# Patient Record
Sex: FEMALE | Race: BLACK OR AFRICAN AMERICAN
[De-identification: names, ages, dates, MRNs, and addresses within clinical notes are randomized per-mention and may not be internally consistent; named-entity substitution may affect disease eponyms.]

---

## 2021-06-11 ENCOUNTER — HOSPITAL ENCOUNTER (EMERGENCY)
Dept: HOSPITAL 63 - ER | Age: 26
Discharge: HOME | End: 2021-06-11
Payer: COMMERCIAL

## 2021-06-11 VITALS — WEIGHT: 257.5 LBS | HEIGHT: 66 IN | BODY MASS INDEX: 41.38 KG/M2

## 2021-06-11 VITALS — SYSTOLIC BLOOD PRESSURE: 99 MMHG | DIASTOLIC BLOOD PRESSURE: 47 MMHG

## 2021-06-11 DIAGNOSIS — R10.13: Primary | ICD-10-CM

## 2021-06-11 DIAGNOSIS — F12.10: ICD-10-CM

## 2021-06-11 DIAGNOSIS — F17.210: ICD-10-CM

## 2021-06-11 DIAGNOSIS — I10: ICD-10-CM

## 2021-06-11 LAB
% ATYL: 6 % (ref 0–0)
% LYMPHS: 21 % (ref 24–48)
% MONOS: 3 % (ref 0–10)
% SEGS: 69 % (ref 35–66)
ALBUMIN SERPL-MCNC: 3.8 G/DL (ref 3.4–5)
ALBUMIN/GLOB SERPL: 0.8 {RATIO} (ref 1–1.7)
ALP SERPL-CCNC: 75 U/L (ref 46–116)
ALT SERPL-CCNC: 43 U/L (ref 14–59)
AMPHETAMINE/METHAMPHETAMINE: (no result)
ANION GAP SERPL CALC-SCNC: 15 MMOL/L (ref 6–14)
APTT PPP: YELLOW S
AST SERPL-CCNC: 28 U/L (ref 15–37)
BACTERIA #/AREA URNS HPF: (no result) /HPF
BARBITURATES UR-MCNC: (no result) UG/ML
BASOPHILS # BLD AUTO: 0.1 X10^3/UL (ref 0–0.2)
BASOPHILS NFR BLD: 1 % (ref 0–3)
BENZODIAZ UR-MCNC: (no result) UG/L
BILIRUB SERPL-MCNC: 0.4 MG/DL (ref 0.2–1)
BILIRUB UR QL STRIP: (no result)
BUN/CREAT SERPL: 13 (ref 6–20)
CA-I SERPL ISE-MCNC: 10 MG/DL (ref 7–20)
CALCIUM SERPL-MCNC: 9.4 MG/DL (ref 8.5–10.1)
CANNABINOIDS UR-MCNC: (no result) UG/L
CHLORIDE SERPL-SCNC: 104 MMOL/L (ref 98–107)
CO2 SERPL-SCNC: 22 MMOL/L (ref 21–32)
COCAINE UR-MCNC: (no result) NG/ML
CREAT SERPL-MCNC: 0.8 MG/DL (ref 0.6–1)
EOSINOPHIL NFR BLD AUTO: 1 % (ref 0–5)
EOSINOPHIL NFR BLD: 0.2 X10^3/UL (ref 0–0.7)
EOSINOPHIL NFR BLD: 1 % (ref 0–3)
ERYTHROCYTE [DISTWIDTH] IN BLOOD BY AUTOMATED COUNT: 14.9 % (ref 11.5–14.5)
FIBRINOGEN PPP-MCNC: (no result) MG/DL
GFR SERPLBLD BASED ON 1.73 SQ M-ARVRAT: 105.8 ML/MIN
GLOBULIN SER-MCNC: 4.5 G/DL (ref 2.2–3.8)
GLUCOSE SERPL-MCNC: 128 MG/DL (ref 70–99)
GLUCOSE UR STRIP-MCNC: (no result) MG/DL
HCT VFR BLD CALC: 46.3 % (ref 36–47)
HGB BLD-MCNC: 14.9 G/DL (ref 12–15.5)
LIPASE: 59 U/L (ref 73–393)
LYMPHOCYTES # BLD: 3.9 X10^3/UL (ref 1–4.8)
LYMPHOCYTES NFR BLD AUTO: 23 % (ref 24–48)
MCH RBC QN AUTO: 27 PG (ref 25–35)
MCHC RBC AUTO-ENTMCNC: 32 G/DL (ref 31–37)
MCV RBC AUTO: 83 FL (ref 79–100)
METHADONE SERPL-MCNC: (no result) NG/ML
MONO #: 0.7 X10^3/UL (ref 0–1.1)
MONOCYTES NFR BLD: 4 % (ref 0–9)
NEUT #: 12.3 X10^3UL (ref 1.8–7.7)
NEUTROPHILS NFR BLD AUTO: 71 % (ref 31–73)
NITRITE UR QL STRIP: (no result)
OPIATES UR-MCNC: (no result) NG/ML
PCP SERPL-MCNC: (no result) MG/DL
PLATELET # BLD AUTO: 297 X10^3/UL (ref 140–400)
PLATELET # BLD EST: ADEQUATE 10*3/UL
POTASSIUM SERPL-SCNC: 3.5 MMOL/L (ref 3.5–5.1)
PROT SERPL-MCNC: 8.3 G/DL (ref 6.4–8.2)
RBC # BLD AUTO: 5.6 X10^6/UL (ref 3.5–5.4)
RBC #/AREA URNS HPF: (no result) /HPF (ref 0–2)
SODIUM SERPL-SCNC: 141 MMOL/L (ref 136–145)
SP GR UR STRIP: 1.01
SQUAMOUS #/AREA URNS LPF: (no result) /LPF
U PREG PATIENT: NEGATIVE
UROBILINOGEN UR-MCNC: 0.2 MG/DL
WBC # BLD AUTO: 17.3 X10^3/UL (ref 4–11)
WBC #/AREA URNS HPF: (no result) /HPF (ref 0–4)

## 2021-06-11 PROCEDURE — 81025 URINE PREGNANCY TEST: CPT

## 2021-06-11 PROCEDURE — 99285 EMERGENCY DEPT VISIT HI MDM: CPT

## 2021-06-11 PROCEDURE — 96375 TX/PRO/DX INJ NEW DRUG ADDON: CPT

## 2021-06-11 PROCEDURE — 85025 COMPLETE CBC W/AUTO DIFF WBC: CPT

## 2021-06-11 PROCEDURE — 80053 COMPREHEN METABOLIC PANEL: CPT

## 2021-06-11 PROCEDURE — 85007 BL SMEAR W/DIFF WBC COUNT: CPT

## 2021-06-11 PROCEDURE — 83690 ASSAY OF LIPASE: CPT

## 2021-06-11 PROCEDURE — 87086 URINE CULTURE/COLONY COUNT: CPT

## 2021-06-11 PROCEDURE — 96374 THER/PROPH/DIAG INJ IV PUSH: CPT

## 2021-06-11 PROCEDURE — 81001 URINALYSIS AUTO W/SCOPE: CPT

## 2021-06-11 PROCEDURE — 80307 DRUG TEST PRSMV CHEM ANLYZR: CPT

## 2021-06-11 PROCEDURE — 36415 COLL VENOUS BLD VENIPUNCTURE: CPT

## 2021-06-11 PROCEDURE — 87077 CULTURE AEROBIC IDENTIFY: CPT

## 2021-06-11 PROCEDURE — 74177 CT ABD & PELVIS W/CONTRAST: CPT

## 2021-06-11 PROCEDURE — 96361 HYDRATE IV INFUSION ADD-ON: CPT

## 2021-06-11 PROCEDURE — 93005 ELECTROCARDIOGRAM TRACING: CPT

## 2021-06-11 NOTE — RAD
INDICATION: Reason: etoh abuse, epigastric pain / Spl. Instructions:  / History: .  



COMPARISON: None.



TECHNIQUE:



Axial CT images obtained through the abdomen and pelvis with contrast.



One or more of the following individualized dose reduction techniques were utilized for this examinat
ion:  1. Automated exposure control;  2. Adjustment of the mA and/or kV according to patient size;  3
. Use of iterative reconstruction technique.



FINDINGS:



Abdominal aorta is not aneurysmal.

No intrahepatic bile duct dilation. Liver is prominent in size. Gallbladder is distended at time of e
xam.

No peripancreatic fluid collection.

Spleen is unremarkable.

No hydronephrosis.

Urinary bladder is partially distended.

Fat-containing umbilical hernia.

Uterus is visualized.

No periappendiceal inflammatory changes.

No dilated loops of bowel to suggest obstruction.

Degenerative changes of the spine. This includes at L4-5 where there is a disc protrusion associated 
central canal stenosis.



IMPRESSION:



*  No evidence of bowel obstruction or appendicitis.



*  No hydronephrosis.



Electronically signed by: Carlitos Kaur MD (6/11/2021 10:20 AM) [id number]

## 2021-06-11 NOTE — PHYS DOC
Past History


Past Medical History:  Alcoholism, Anxiety, Depression, Hypertension


Past Surgical History:  No Surgical History


Alcohol Use:  Heavy


Additional Alcohol Information:  


drinks one bottle palmason daily





Adult General


Chief Complaint


Chief Complaint:  ABDOMINAL PAIN





HPI


HPI





Patient is a 25-year-old presenting for abdominal pain.  Onset was this morning 

shortly after waking up.  No known inciting event or trauma.  Nothing known 

makes better or worse.  Patient reports mainly epigastric pain that is 

generalized in nature without radiation, states it is gnawing and making her 

nauseous.  She has suffered several episodes of nonbloody nonbilious emesis.  

Admits history of alcoholism, drinks at least a pint of hard liquor daily with 

last drink yesterday evening.  Also reports she smokes marijuana, denies any 

change in type/strain or increased frequency.  Has history of abdominal pain 

like this in the past but is never been diagnosed with pancreatitis or been ho

spitalized.  No fever, hemoptysis, history of abdominal surgeries





Review of Systems


Review of Systems


Fourteen body systems of review of systems have been reviewed. See HPI for 

pertinent positives and negative responses, other wise all other systems are 

negative, non-pertinent or non-contributory





Allergies


Allergies





Allergies








Coded Allergies Type Severity Reaction Last Updated Verified


 


  No Known Drug Allergies    6/11/21 No











Physical Exam


Physical Exam


Constitutional: Well developed, well nourished, diaphoretic and appears in acute

pain


HENT: Normocephalic, atraumatic, diaphoretic, bilateral external ears normal, 

oropharynx moist, no oral exudates, nose normal. 


Eyes: PERRLA, EOMI, conjunctiva normal, no discharge.  


Neck: Normal range of motion, no tenderness, supple, no stridor.  


Cardiovascular: Heart rate regular, sinus rhythm, no murmurs rubs or gallops


Lungs & Thorax:  Bilateral breath sounds clear to auscultation 


Abdomen: Bowel sounds normal, soft, epigastric tenderness, no masses, no 

pulsatile masses.  Nonsurgical abdomen, no peritoneal signs


Skin: Warm, dry, no erythema, no rash.  


Back: No tenderness, no CVA tenderness.  


Extremities: No tenderness, no cyanosis, no clubbing, ROM intact, no edema.  


Neurologic: Alert and oriented X 3, grossly normal motor & sensory function, no 

focal deficits noted. 


Psychologic: Anxious affect, judgement normal, mood normal.





Current Patient Data


Vital Signs





                                   Vital Signs








  Date Time  Temp Pulse Resp B/P (MAP) Pulse Ox O2 Delivery O2 Flow Rate FiO2


 


6/11/21 09:10 97.3 56 24 148/94 (112) 100 Room Air  








Lab Results





Laboratory Tests








Test


 6/11/21


09:55


 


White Blood Count 17.3 x10^3/uL 


 


Red Blood Count 5.60 x10^6/uL 


 


Hemoglobin 14.9 g/dL 


 


Hematocrit 46.3 % 


 


Mean Corpuscular Volume 83 fL 


 


Mean Corpuscular Hemoglobin 27 pg 


 


Mean Corpuscular Hemoglobin


Concent 32 g/dL 





 


Red Cell Distribution Width 14.9 % 


 


Platelet Count 297 x10^3/uL 


 


Neutrophils (%) (Auto) 71 % 


 


Lymphocytes (%) (Auto) 23 % 


 


Monocytes (%) (Auto) 4 % 


 


Eosinophils (%) (Auto) 1 % 


 


Basophils (%) (Auto) 1 % 


 


Neutrophils # (Auto) 12.3 x10^3uL 


 


Lymphocytes # (Auto) 3.9 x10^3/uL 


 


Monocytes # (Auto) 0.7 x10^3/uL 


 


Eosinophils # (Auto) 0.2 x10^3/uL 


 


Basophils # (Auto) 0.1 x10^3/uL 


 


Platelet Estimate Pending 


 


Sodium Level 141 mmol/L 


 


Potassium Level 3.5 mmol/L 


 


Chloride Level 104 mmol/L 


 


Carbon Dioxide Level 22 mmol/L 


 


Anion Gap 15 


 


Blood Urea Nitrogen 10 mg/dL 


 


Creatinine 0.8 mg/dL 


 


Estimated GFR


(Cockcroft-Gault) 105.8 





 


BUN/Creatinine Ratio 13 


 


Glucose Level 128 mg/dL 


 


Calcium Level 9.4 mg/dL 


 


Total Bilirubin 0.4 mg/dL 


 


Aspartate Amino Transf


(AST/SGOT) 28 U/L 





 


Alanine Aminotransferase


(ALT/SGPT) 43 U/L 





 


Alkaline Phosphatase 75 U/L 


 


Total Protein 8.3 g/dL 


 


Albumin 3.8 g/dL 


 


Albumin/Globulin Ratio 0.8 


 


Lipase 59 U/L 








Current Medications








 Medications


  (Trade)  Dose


 Ordered  Sig/Marisabel


 Route


 PRN Reason  Start Time


 Stop Time Status Last Admin


Dose Admin


 


 Sodium Chloride  1,000 ml @ 


 1,000 mls/hr  Q1H


 IV


   6/11/21 09:45


 6/11/21 10:44 DC 6/11/21 09:45





 


 Fentanyl Citrate


  (Fentanyl 2ml


 Vial)  75 mcg  1X  ONCE


 IVP


   6/11/21 09:45


 6/11/21 09:46 DC 6/11/21 09:50





 


 Lorazepam


  (Ativan Inj)  0.5 mg  1X  ONCE


 IVP


   6/11/21 09:45


 6/11/21 09:46 DC 6/11/21 09:47





 


 Iohexol


  (Omnipaque 300


 Mg/ml)  75 ml  1X  ONCE


 IV


   6/11/21 09:45


 6/11/21 09:46 DC 6/11/21 10:01














EKG


EKG


EKG ordered and interpreted by myself at 1026 hrs. as sinus rhythm at 52 bpm, 

unremarkable intervals, no axis deviation, no acute ischemic findings, no STEMI





Radiology/Procedures


Radiology/Procedures





INDICATION: Reason: etoh abuse, epigastric pain / Spl. Instructions:  / History:

.  





COMPARISON: None.





TECHNIQUE:





Axial CT images obtained through the abdomen and pelvis with contrast.





One or more of the following individualized dose reduction techniques were 

utilized for this examination:  1. Automated exposure control;  2. Adjustment of

the mA and/or kV according to patient size;  3. Use of iterative reconstruction 

technique.





FINDINGS:





Abdominal aorta is not aneurysmal.


No intrahepatic bile duct dilation. Liver is prominent in size. Gallbladder is d

istended at time of exam.


No peripancreatic fluid collection.


Spleen is unremarkable.


No hydronephrosis.


Urinary bladder is partially distended.


Fat-containing umbilical hernia.


Uterus is visualized.


No periappendiceal inflammatory changes.


No dilated loops of bowel to suggest obstruction.


Degenerative changes of the spine. This includes at L4-5 where there is a disc 

protrusion associated central canal stenosis.





IMPRESSION:





*  No evidence of bowel obstruction or appendicitis.





*  No hydronephrosis.





Electronically signed by: Carlitos Kaur MD (6/11/2021 10:20 AM) [id number]











Heart Score


C/O Chest Pain:  No


HEART Score for Chest Pain:  








HEART Score for Chest Pain Response (Comments) Value


 


History Slighlty/Non-Suspicious 0


 


ECG Normal 0


 


Age < 45 0


 


Risk Factors                            1 or 2 Risk Factors 1


 


Total  1








Risk Factors:


Risk Factors:  DM, Current or recent (<one month) smoker, HTN, HLP, family 

history of CAD, obesity.


Risk Scores:


Risk Factors:  DM, Current or recent (<one month) smoker, HTN, HLP, family hi

story of CAD, obesity.





Course & Med Decision Making


Course & Med Decision Making


Discussed with the patient all findings and diagnostic testing. I discussed most

likely diagnosis of abdominal pain unspecified.  Discussed pain could be due to 

alcohol dependence and sequelae from drinking more than usual from night prior, 

could be gastritis.  Also disclosed this could be cannabinoid hyperemesis 

syndrome.  Also disclosed this might be an acute presentation more concerning 

pathology.  Patient responded to ER intervention, was asymptomatic after IV 

fluids, IV pain control and low-dose benzodiazepine.  I reviewed need for 

further diagnostic work-up and discussed role of potential hospitalization; 

however, joint decision made given complete improvement in symptoms for 

discharge home with close PCP follow-up.  As such, I stressed need for close 

outpatient follow-up to review today's ER visit. Strict return precautions were 

also discussed at length with good understanding by patient. Patient voiced 

understanding and agreement with the plan. Patient knows to come back for repeat

evaluation if concerning signs or symptoms present prior to outpatient follow-

up. Hemodynamically stable, ambulatory and well-appearing at time of 

disposition.





Dragon Disclaimer


Dragon Disclaimer


This electronic medical record was generated, in whole or in part, using a voice

recognition dictation system.





Departure


Departure:


Impression:  


   Primary Impression:  


   Abdominal pain


Disposition:  01 HOME / SELF CARE / HOMELESS


Condition:  IMPROVED


Referrals:  


ALISON HEADLEY MD (PCP)


Patient Instructions:  Abdominal Pain





Additional Instructions:  


You have been evaluated in the Emergency Department today for abdominal pain. 

Your evaluation was not suggestive of any emergent condition requiring medical 

intervention at this time. However, some abdominal problems make take more time 

to appear. Therefore, it is important for you to watch for any new symptoms or 

worsening of your current condition. 


As discussed, please contact your primary care physician immediately after your 

departure to review ER visit today.  In addition, it would be beneficial to 

discuss safely cutting back and hopefully stopping your alcohol and marijuana 

use


Return to the Emergency Department if you experience worsening pain, persistent 

fevers greater than 100.4, recurrent vomiting, blood in vomit, blood in stool, 

dark tarry stool, chest pain, difficulty breathing, or any other concerning 

symptoms.











RITU STEVEN DO                 Jun 11, 2021 09:36

## 2021-06-11 NOTE — EKG
[facility] [address], [place] [address]

Test Date:    2021               Test Time:    10:17:53

Pat Name:     LISA XIONG        Department:   

Patient ID:   [id number]           Room:          

Gender:       F                        Technician:   FRANCIA

:          1995               Requested By: RITU STEVEN

Order Number: [id number]            Reading MD:     

                                 Measurements

Intervals                              Axis          

Rate:         52                       P:            57

ND:           158                      QRS:          34

QRSD:         90                       T:            31

QT:           406                                    

QTc:          383                                    

                           Interpretive Statements

SINUS RHYTHM

OTHERWISE NORMAL ECG

RI6.02

No previous ECG available for comparison

## 2022-01-13 ENCOUNTER — HOSPITAL ENCOUNTER (EMERGENCY)
Dept: HOSPITAL 63 - ER | Age: 27
LOS: 1 days | Discharge: HOME | End: 2022-01-14
Payer: COMMERCIAL

## 2022-01-13 VITALS — DIASTOLIC BLOOD PRESSURE: 92 MMHG | SYSTOLIC BLOOD PRESSURE: 140 MMHG

## 2022-01-13 VITALS — WEIGHT: 257.5 LBS | BODY MASS INDEX: 41.38 KG/M2 | HEIGHT: 66 IN

## 2022-01-13 DIAGNOSIS — F32.9: Primary | ICD-10-CM

## 2022-01-13 DIAGNOSIS — Z76.0: ICD-10-CM

## 2022-01-13 DIAGNOSIS — Z20.822: ICD-10-CM

## 2022-01-13 DIAGNOSIS — B34.9: ICD-10-CM

## 2022-01-13 DIAGNOSIS — I10: ICD-10-CM

## 2022-01-13 PROCEDURE — 87428 SARSCOV & INF VIR A&B AG IA: CPT

## 2022-01-13 PROCEDURE — 99283 EMERGENCY DEPT VISIT LOW MDM: CPT

## 2022-01-13 NOTE — PHYS DOC
Past History


Past Medical History:  Alcoholism, Anxiety, Constipation, Depression, 

Hypertension


Past Surgical History:  No Surgical History


Alcohol Use:  Heavy





General Adult


EDM:


Chief Complaint:  MEDICATION REFILL





HPI:


HPI:


".. I ve got a fever... out of my Lexapro .. the last three days..  and under 

stress...."





Patient is a 26 year old female who presents with request for med refill and a 

dose of Lexapro tonight.  Patient normally on Lexapro 20 mg every night.  

Patient has had flu vaccination as well as COVID vaccination.   Patient 

concerned because she has a fever.  Did check earlier in the week and her rapid 

COVID was negative.  No recent travel.  No specific ill contacts.  Does have a 

history of depression and anxiety..  Patient denies any current suicidal 

ideation or homicidal ideation.  Has no formed plan for suicide.  Patient does 

have a primary care physician as well as counselor who refills her medications. 

 Patient complains of generalized myalgia, arthralgia and malaise.  Pt.  

declines psychiatric assessment team evaluation at this time.  Patient is 

accompanied with her mother.





Review of Systems:


Review of Systems:


Constitutional: Complains of fever or chills 


Eyes:  Denies change in visual acuity 


HENT:  Denies nasal congestion or sore throat 


Respiratory: Complains of a nonproductive cough 


Cardiovascular:  Denies chest pain or edema 


GI:  Denies abdominal pain, nausea, vomiting, bloody stools or diarrhea 


: Denies dysuria 


Musculoskeletal: Complains of myalgia and arthralgia and malaise


Integument:  Denies rash 


Neurologic:  Denies headache, focal weakness or sensory changes 


Endocrine:  Denies polyuria or polydipsia 


Lymphatic:  Denies swollen glands 


Psychiatric:  Denies depression or anxiety





Family History:


Family History:


Noncontributory to presentation





Current Medications:


Current Meds:


See nursing for home meds





Allergies:


Allergies:





Allergies








Coded Allergies Type Severity Reaction Last Updated Verified


 


  No Known Drug Allergies    6/11/21 No











Physical Exam:


PE:





Constitutional: Well developed, well nourished, moderate acute distress, non-

toxic appearance. []


HENT: Normocephalic, atraumatic, bilateral external ears normal, oropharynx 

moist, no oral exudates, nose swollen turbinates clear rhinorrhea.  Postnasal 

drainage.


Eyes: PERRLA, EOMI, conjunctiva normal, no discharge. [] 


Neck: Normal range of motion, no tenderness, supple, no stridor. [] 


Cardiovascular:Heart rate regular rhythm, no murmur []


Lungs & Thorax:  Bilateral breath sounds equal at apex with a few scattered 

wheezes on Auscultation []


Abdomen: Bowel sounds normal, soft, no tenderness, no masses, no pulsatile 

masses.  Obese


Skin: Warm, dry, no erythema, no rash. [] 


Back: No tenderness, no CVA tenderness. [] 


Extremities: No tenderness, no cyanosis, no clubbing, ROM intact, no edema. [] 


Neurologic: Alert and oriented X 3, normal motor function, normal sensory 

function, no focal deficits noted. []


Psychologic: Affect anxious, angry,, judgement-appears to have good insight,, 

mood complaints of depression but no suicidal ideation currently.





EKG:


EKG:


[]





Radiology/Procedures:


Radiology/Procedures:


[]





Heart Score:


C/O Chest Pain:  N/A


Risk Factors:


Risk Factors:  DM, Current or recent (<one month) smoker, HTN, HLP, family 

history of CAD, obesity.


Risk Scores:


Score 0 - 3:  2.5% MACE over next 6 weeks - Discharge Home


Score 4 - 6:  20.3% MACE over next 6 weeks - Admit for Clinical Observation


Score 7 - 10:  72.7% MACE over next 6 weeks - Early Invasive Strategies





Course & Med Decision Making:


Course & Med Decision Making


Pertinent Labs and Imaging studies reviewed. (See chart for details)








Patient push clear fluids.  Patient take her Lexapro 20 mg daily.  To call her 

physician in the morning.  Currently this is not a drug we have in stock at [facility].  Patient informed of her COVID and influenza screen.  Mother still 

practices if she has COVID and self isolate.  Return if any concerns.  Push 

fluids.  Clear fluid diet.  Tylenol and ibuprofen for discomfort.  Return if any

 concerns.  Viral isolation-and must wear mask covering nose and mouth and her 

interactions with others.  Must self isolate for the next 5 days.   To retest 

after 5 days if needed for return to work.  Currently rapid influenza and COVID 

results are negative








Impression:





1.  Depression-with no current suicidal ideation or suicidal plan


2.  Viral syndrome





[]





Dragon Disclaimer:


Dragon Disclaimer:


This electronic medical record was generated, in whole or in part, using a voice

 recognition dictation system.





Departure


Departure:


Referrals:  


ALISON HEADLEY MD (PCP)





Dragon Disclaimer


This chart was dictated in whole or in part using Voice Recognition software in 

a busy, high-work load, and often noisy Emergency Department environment.  It 

may contain unintended and wholly unrecognized errors or omissions.





Dragon Disclaimer


This chart was dictated in whole or in part using Voice Recognition software in 

a busy, high-work load, and often noisy Emergency Department environment.  It 

may contain unintended and wholly unrecognized errors or omissions.











CARLITOS SALAS MD           Jan 13, 2022 23:48

## 2022-01-14 LAB
INFLUENZA A PATIENT: NEGATIVE
INFLUENZA B PATIENT: NEGATIVE

## 2022-04-22 ENCOUNTER — HOSPITAL ENCOUNTER (EMERGENCY)
Dept: HOSPITAL 63 - ER | Age: 27
LOS: 1 days | Discharge: PSYCHIATRIC OR SUBSTANCE USE FACILITY | End: 2022-04-23
Payer: COMMERCIAL

## 2022-04-22 VITALS — BODY MASS INDEX: 41.31 KG/M2 | WEIGHT: 257.06 LBS | HEIGHT: 66 IN

## 2022-04-22 VITALS — SYSTOLIC BLOOD PRESSURE: 142 MMHG | DIASTOLIC BLOOD PRESSURE: 94 MMHG

## 2022-04-22 DIAGNOSIS — Z20.822: ICD-10-CM

## 2022-04-22 DIAGNOSIS — F41.9: ICD-10-CM
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
## 2022-04-22 DIAGNOSIS — F32.9: Primary | ICD-10-CM

## 2022-04-22 DIAGNOSIS — I10: ICD-10-CM

## 2022-04-22 DIAGNOSIS — F10.20: ICD-10-CM

## 2022-04-22 DIAGNOSIS — R45.851: ICD-10-CM

## 2022-04-22 LAB
ACETAMIN: < 2 MCG/ML (ref 10–30)
ALBUMIN SERPL-MCNC: 3.5 G/DL (ref 3.4–5)
ALBUMIN/GLOB SERPL: 0.7 {RATIO} (ref 1–1.7)
ALP SERPL-CCNC: 90 U/L (ref 46–116)
ALT SERPL-CCNC: 46 U/L (ref 14–59)
AMPHETAMINE/METHAMPHETAMINE: (no result)
ANION GAP SERPL CALC-SCNC: 10 MMOL/L (ref 6–14)
APTT PPP: YELLOW S
AST SERPL-CCNC: 35 U/L (ref 15–37)
BACTERIA #/AREA URNS HPF: 0 /HPF
BARBITURATES UR-MCNC: (no result) UG/ML
BASOPHILS # BLD AUTO: 0.1 X10^3/UL (ref 0–0.2)
BASOPHILS NFR BLD: 1 % (ref 0–3)
BENZODIAZ UR-MCNC: (no result) UG/L
BILIRUB SERPL-MCNC: 0.3 MG/DL (ref 0.2–1)
BUN/CREAT SERPL: 11 (ref 6–20)
CA-I SERPL ISE-MCNC: 8 MG/DL (ref 7–20)
CALCIUM SERPL-MCNC: 9.1 MG/DL (ref 8.5–10.1)
CANNABINOIDS UR-MCNC: (no result) UG/L
CHLORIDE SERPL-SCNC: 102 MMOL/L (ref 98–107)
CO2 SERPL-SCNC: 25 MMOL/L (ref 21–32)
COCAINE UR-MCNC: (no result) NG/ML
CREAT SERPL-MCNC: 0.7 MG/DL (ref 0.6–1)
EOSINOPHIL NFR BLD: 0.1 X10^3/UL (ref 0–0.7)
EOSINOPHIL NFR BLD: 1 % (ref 0–3)
ERYTHROCYTE [DISTWIDTH] IN BLOOD BY AUTOMATED COUNT: 14.3 % (ref 11.5–14.5)
FIBRINOGEN PPP-MCNC: CLEAR MG/DL
GFR SERPLBLD BASED ON 1.73 SQ M-ARVRAT: 122.4 ML/MIN
GLOBULIN SER-MCNC: 4.8 G/DL (ref 2.2–3.8)
GLUCOSE SERPL-MCNC: 89 MG/DL (ref 70–99)
GLUCOSE UR STRIP-MCNC: (no result) MG/DL
HCT VFR BLD CALC: 43.5 % (ref 36–47)
HGB BLD-MCNC: 14.1 G/DL (ref 12–15.5)
LYMPHOCYTES # BLD: 2.9 X10^3/UL (ref 1–4.8)
LYMPHOCYTES NFR BLD AUTO: 22 % (ref 24–48)
MCH RBC QN AUTO: 28 PG (ref 25–35)
MCHC RBC AUTO-ENTMCNC: 32 G/DL (ref 31–37)
MCV RBC AUTO: 85 FL (ref 79–100)
METHADONE SERPL-MCNC: (no result) NG/ML
MONO #: 0.8 X10^3/UL (ref 0–1.1)
MONOCYTES NFR BLD: 6 % (ref 0–9)
NEUT #: 9.2 X10^3UL (ref 1.8–7.7)
NEUTROPHILS NFR BLD AUTO: 70 % (ref 31–73)
NITRITE UR QL STRIP: (no result)
OPIATES UR-MCNC: (no result) NG/ML
PCP SERPL-MCNC: (no result) MG/DL
PLATELET # BLD AUTO: 280 X10^3/UL (ref 140–400)
POTASSIUM SERPL-SCNC: 4 MMOL/L (ref 3.5–5.1)
PROT SERPL-MCNC: 8.3 G/DL (ref 6.4–8.2)
RBC # BLD AUTO: 5.11 X10^6/UL (ref 3.5–5.4)
RBC #/AREA URNS HPF: (no result) /HPF (ref 0–2)
SALIC: 2.3 MG/DL (ref 2.8–20)
SODIUM SERPL-SCNC: 137 MMOL/L (ref 136–145)
SP GR UR STRIP: 1.01
SQUAMOUS #/AREA URNS LPF: (no result) /LPF
UROBILINOGEN UR-MCNC: 0.2 MG/DL
WBC # BLD AUTO: 13.2 X10^3/UL (ref 4–11)
WBC #/AREA URNS HPF: 0 /HPF (ref 0–4)

## 2022-04-22 PROCEDURE — 85025 COMPLETE CBC W/AUTO DIFF WBC: CPT

## 2022-04-22 PROCEDURE — C9803 HOPD COVID-19 SPEC COLLECT: HCPCS

## 2022-04-22 PROCEDURE — 87426 SARSCOV CORONAVIRUS AG IA: CPT

## 2022-04-22 PROCEDURE — 80053 COMPREHEN METABOLIC PANEL: CPT

## 2022-04-22 PROCEDURE — 99285 EMERGENCY DEPT VISIT HI MDM: CPT

## 2022-04-22 PROCEDURE — 96365 THER/PROPH/DIAG IV INF INIT: CPT

## 2022-04-22 PROCEDURE — U0003 INFECTIOUS AGENT DETECTION BY NUCLEIC ACID (DNA OR RNA); SEVERE ACUTE RESPIRATORY SYNDROME CORONAVIRUS 2 (SARS-COV-2) (CORONAVIRUS DISEASE [COVID-19]), AMPLIFIED PROBE TECHNIQUE, MAKING USE OF HIGH THROUGHPUT TECHNOLOGIES AS DESCRIBED BY CMS-2020-01-R: HCPCS

## 2022-04-22 PROCEDURE — 80329 ANALGESICS NON-OPIOID 1 OR 2: CPT

## 2022-04-22 PROCEDURE — G0480 DRUG TEST DEF 1-7 CLASSES: HCPCS

## 2022-04-22 PROCEDURE — 36415 COLL VENOUS BLD VENIPUNCTURE: CPT

## 2022-04-22 PROCEDURE — 80307 DRUG TEST PRSMV CHEM ANLYZR: CPT

## 2022-04-22 PROCEDURE — 81001 URINALYSIS AUTO W/SCOPE: CPT

## 2022-04-22 NOTE — PHYS DOC
Past History


Past Medical History:  Alcoholism, Anxiety, Constipation, Depression, 

Hypertension


Additional Past Medical Histor:  Hip


 (VIKKI BAUTISTA)


Past Medical History:  Alcoholism


 (CARLITOS SEVILLA MD)


Past Surgical History:  No Surgical History


 (VIKKI BAUTISTA)


Alcohol Use:  Heavy


 (VIKKI BAUTISTA)





General Adult


EDM:


Chief Complaint:  SUICIDAL IDEATION





HPI:


HPI:


Patient is a 26-year-old female who presents to the emergency department for 

suicidal ideation.  When asked what her plan is patient states "everything".  

Her family member at bedside states that she had talked about setting the house 

on fire.  Patient reports she follows up with therapist and saw them today.  She

has anxiety and depression and was prescribed Lexapro but is not taking that as 

directed.  She denies homicidal ideation but family member at bedside reports 

that she is having homicidal thoughts.


 (VIKKI BAUTISTA)





Review of Systems:


Review of Systems:


Constitutional:  Denies fever or chills 


Eyes:  Denies change in visual acuity 


HENT:  Denies nasal congestion or sore throat 


Respiratory:  Denies cough or shortness of breath 


Cardiovascular:  Denies chest pain or edema 


GI:  Denies abdominal pain, nausea, vomiting, bloody stools or diarrhea 


: Denies dysuria 


Musculoskeletal:  Denies back pain or joint pain 


Integument:  Denies rash 


Neurologic:  Denies headache, focal weakness or sensory changes 


Endocrine:  Denies polyuria or polydipsia 


Lymphatic:  Denies swollen glands 


Psychiatric: See HPI


 (VIKKI BAUTISTA)





Allergies:


Allergies:





Allergies








Coded Allergies Type Severity Reaction Last Updated Verified


 


  No Known Drug Allergies    6/11/21 No








 (VIKKI BAUTISTA)





Physical Exam:


PE:





Constitutional: Well developed, well nourished, no acute distress, non-toxic 

appearance. []


HENT: Normocephalic, atraumatic, bilateral external ears normal, oropharynx 

moist, no oral exudates, nose normal. []


Eyes: PERRL, EOMI, conjunctiva normal, no discharge. [] 


Neck: Normal range of motion, no stridor


Cardiovascular: Normal peripheral perfusion


Lungs & Thorax: Normal work of breathing, no tachypnea


Abdomen: Soft and obese


Skin: Warm, dry, no erythema, no rash. [] 


Back: No tenderness, normal range of motion


Extremities: No tenderness, no cyanosis, no clubbing, ROM intact, no edema. [] 


Neurologic: Alert and oriented X 3, normal motor function, normal sensory 

function, no focal deficits noted. []


Psychologic: Affect normal, judgement normal, mood normal. []


 (VIKKI BAUTISTA)





Current Patient Data:


Labs:





Laboratory Tests








Test


 4/22/22


19:42 4/22/22


19:47


 


SARS-CoV-2 Antigen (Rapid) Negative  


 


White Blood Count  13.2 x10^3/uL 


 


Red Blood Count  5.11 x10^6/uL 


 


Hemoglobin  14.1 g/dL 


 


Hematocrit  43.5 % 


 


Mean Corpuscular Volume  85 fL 


 


Mean Corpuscular Hemoglobin  28 pg 


 


Mean Corpuscular Hemoglobin


Concent 


 32 g/dL 





 


Red Cell Distribution Width  14.3 % 


 


Platelet Count  280 x10^3/uL 


 


Neutrophils (%) (Auto)  70 % 


 


Lymphocytes (%) (Auto)  22 % 


 


Monocytes (%) (Auto)  6 % 


 


Eosinophils (%) (Auto)  1 % 


 


Basophils (%) (Auto)  1 % 


 


Neutrophils # (Auto)  9.2 x10^3uL 


 


Lymphocytes # (Auto)  2.9 x10^3/uL 


 


Monocytes # (Auto)  0.8 x10^3/uL 


 


Eosinophils # (Auto)  0.1 x10^3/uL 


 


Basophils # (Auto)  0.1 x10^3/uL 


 


Urine Collection Type  Clean catch 


 


Urine Color  Yellow 


 


Urine Clarity  Clear 


 


Urine pH  6.0 


 


Urine Specific Gravity  1.015 


 


Urine Protein  Neg 


 


Urine Glucose (UA)  Neg mg/dL 


 


Urine Ketones (Stick)  Neg mg/dL 


 


Urine Blood  Mod 


 


Urine Nitrite  Neg 


 


Urine Bilirubin  Neg 


 


Urine Urobilinogen Dipstick  0.2 mg/dL 


 


Urine Leukocyte Esterase  Neg 


 


Urine RBC  3-5 /HPF 


 


Urine WBC  0 /HPF 


 


Urine Squamous Epithelial


Cells 


 Many /LPF 





 


Urine Bacteria  0 /HPF 


 


Sodium Level  137 mmol/L 


 


Potassium Level  4.0 mmol/L 


 


Chloride Level  102 mmol/L 


 


Carbon Dioxide Level  25 mmol/L 


 


Anion Gap  10 


 


Blood Urea Nitrogen  8 mg/dL 


 


Creatinine  0.7 mg/dL 


 


Estimated GFR


(Cockcroft-Gault) 


 122.4 





 


BUN/Creatinine Ratio  11 


 


Glucose Level  89 mg/dL 


 


Calcium Level  9.1 mg/dL 


 


Total Bilirubin  0.3 mg/dL 


 


Aspartate Amino Transf


(AST/SGOT) 


 35 U/L 





 


Alanine Aminotransferase


(ALT/SGPT) 


 46 U/L 





 


Alkaline Phosphatase  90 U/L 


 


Total Protein  8.3 g/dL 


 


Albumin  3.5 g/dL 


 


Albumin/Globulin Ratio  0.7 


 


Urine Opiates Screen  Neg 


 


Urine Methadone Screen  Neg 


 


Urine Barbiturates  Neg 


 


Urine Phencyclidine Screen  Neg 


 


Urine


Amphetamine/Methamphetamine 


 Neg 





 


Urine Benzodiazepines Screen  Neg 


 


Urine Cocaine Screen  Neg 


 


Urine Cannabinoids Screen  Pos 


 


Urine Ethyl Alcohol  Pos 








Vital Signs:





                                   Vital Signs








  Date Time  Temp Pulse Resp B/P (MAP) Pulse Ox O2 Delivery O2 Flow Rate FiO2


 


4/22/22 19:30 98.8 90 16 149/92 (111) 97 Room Air  








 (VIKKI BAUTISTA)





EKG:


EKG:


[]


 (VIKKI BAUTISTA)





Radiology/Procedures:


Radiology/Procedures:


[]


 (VIKKI BAUTISTA)





Heart Score:


C/O Chest Pain:  N/A


Risk Factors:


Risk Factors:  DM, Current or recent (<one month) smoker, HTN, HLP, family 

history of CAD, obesity.


Risk Scores:


Score 0 - 3:  2.5% MACE over next 6 weeks - Discharge Home


Score 4 - 6:  20.3% MACE over next 6 weeks - Admit for Clinical Observation


Score 7 - 10:  72.7% MACE over next 6 weeks - Early Invasive Strategies


 (VIKKI BAUTISTA)





Course & Med Decision Making:


Course & Med Decision Making


Pertinent Labs and Imaging studies reviewed. (See chart for details)





[] Patient resents to the emergency department for suicidal ideation.  Suicide 

precautions initiated.  Blood work and urinalysis performed to medically clear 

patient.  Patient be evaluated by member the psychiatric assessment team.


2151: Patient's noted to have mild leukocytosis which is nonspecific, CMP 

unremarkable.  Negative rapid COVID test.  Urinalysis does not show any 

infection.  UDS is positive for marijuana and alcohol.  Ethanol, Tylenol and 

salicylate level was ordered for patient.  Is pending at this time.  Patient to 

be evaluated by member the psychiatric assessment team and this is pending at 

this time.


I discussed these findings and patient's case with Dr. Sevilla who will assume 

patient care at this time due to shift change.


 (VIKKI BAUTISTA)


Course & Med Decision Making














See Bong chart prior shift change for details.








See PAT assessment.





Pt. accepted at [facility]--  Dr. Erickson, Sujit











Impression:





1. Hx. of Alcohol Abuse-  23 current level


2. Depression


3. Anxiety


4. Suicidal Ideation


 (CARLITOS SEVILLA MD)


Dragon Disclaimer:


Dragon Disclaimer:


This electronic medical record was generated, in whole or in part, using a voice

 recognition dictation system.


 (VIKKI BAUTISTA)





Departure


Departure:


Impression:  


   Primary Impression:  


   Suicidal ideation


Referrals:  


ALISON HEADLEY MD (PCP)





Dragon Disclaimer


This chart was dictated in whole or in part using Voice Recognition software in 

a busy, high-work load, and often noisy Emergency Department environment.  It 

may contain unintended and wholly unrecognized errors or omissions.


 (CARLITOS SEVILLA MD)











VIKKI BAUTISTA          Apr 22, 2022 20:02


CARLITOS SEVILLA MD           Apr 22, 2022 22:03

## 2022-05-15 ENCOUNTER — HOSPITAL ENCOUNTER (EMERGENCY)
Dept: HOSPITAL 63 - ER | Age: 27
Discharge: HOME | End: 2022-05-15
Payer: COMMERCIAL

## 2022-05-15 VITALS — DIASTOLIC BLOOD PRESSURE: 88 MMHG | SYSTOLIC BLOOD PRESSURE: 134 MMHG

## 2022-05-15 VITALS — WEIGHT: 254.63 LBS | BODY MASS INDEX: 40.92 KG/M2 | HEIGHT: 66 IN

## 2022-05-15 DIAGNOSIS — R45.851: Primary | ICD-10-CM

## 2022-05-15 DIAGNOSIS — F41.9: ICD-10-CM

## 2022-05-15 DIAGNOSIS — F31.9: ICD-10-CM

## 2022-05-15 LAB
ANION GAP SERPL CALC-SCNC: 11 MMOL/L (ref 6–14)
BASOPHILS # BLD AUTO: 0.1 X10^3/UL (ref 0–0.2)
BASOPHILS NFR BLD: 1 % (ref 0–3)
CA-I SERPL ISE-MCNC: 13 MG/DL (ref 7–20)
CALCIUM SERPL-MCNC: 8.6 MG/DL (ref 8.5–10.1)
CHLORIDE SERPL-SCNC: 105 MMOL/L (ref 98–107)
CO2 SERPL-SCNC: 27 MMOL/L (ref 21–32)
CREAT SERPL-MCNC: 0.9 MG/DL (ref 0.6–1)
EOSINOPHIL NFR BLD: 0.1 X10^3/UL (ref 0–0.7)
EOSINOPHIL NFR BLD: 1 % (ref 0–3)
ERYTHROCYTE [DISTWIDTH] IN BLOOD BY AUTOMATED COUNT: 14.1 % (ref 11.5–14.5)
GFR SERPLBLD BASED ON 1.73 SQ M-ARVRAT: 91.6 ML/MIN
GLUCOSE SERPL-MCNC: 92 MG/DL (ref 70–99)
HCT VFR BLD CALC: 40.4 % (ref 36–47)
HGB BLD-MCNC: 13 G/DL (ref 12–15.5)
LYMPHOCYTES # BLD: 2.6 X10^3/UL (ref 1–4.8)
LYMPHOCYTES NFR BLD AUTO: 19 % (ref 24–48)
MCH RBC QN AUTO: 27 PG (ref 25–35)
MCHC RBC AUTO-ENTMCNC: 32 G/DL (ref 31–37)
MCV RBC AUTO: 85 FL (ref 79–100)
MONO #: 0.8 X10^3/UL (ref 0–1.1)
MONOCYTES NFR BLD: 6 % (ref 0–9)
NEUT #: 10.3 X10^3UL (ref 1.8–7.7)
NEUTROPHILS NFR BLD AUTO: 74 % (ref 31–73)
PLATELET # BLD AUTO: 285 X10^3/UL (ref 140–400)
POTASSIUM SERPL-SCNC: 3.5 MMOL/L (ref 3.5–5.1)
PREG TEST PT QUAL: NEGATIVE
RBC # BLD AUTO: 4.74 X10^6/UL (ref 3.5–5.4)
SODIUM SERPL-SCNC: 143 MMOL/L (ref 136–145)
WBC # BLD AUTO: 13.9 X10^3/UL (ref 4–11)

## 2022-05-15 PROCEDURE — 84703 CHORIONIC GONADOTROPIN ASSAY: CPT

## 2022-05-15 PROCEDURE — 93005 ELECTROCARDIOGRAM TRACING: CPT

## 2022-05-15 PROCEDURE — 85025 COMPLETE CBC W/AUTO DIFF WBC: CPT

## 2022-05-15 PROCEDURE — 80048 BASIC METABOLIC PNL TOTAL CA: CPT

## 2022-05-15 PROCEDURE — 99285 EMERGENCY DEPT VISIT HI MDM: CPT

## 2022-05-15 NOTE — PHYS DOC
Past History


Past Medical History:  Alcoholism


Additional Past Medical Histor:  Hip


 (CHRIS PAUL)


Past Surgical History:  No Surgical History


 (CHRIS PAUL)


Alcohol Use:  Heavy


 (CHRIS PAUL)





General Adult


HPI:


HPI:





Patient is a 26-year-old female presents with suicidal ideation.  Patient states

that her girlfriend broke up with her last week and she has been depressed.  

Patient states that she does have a plan.  Patient discussed taking pills.  

Patient is currently transitioning from female to male and having some anxiety 

and depression.  History of alcoholism.


 (CHRIS PAUL)





Review of Systems:


Review of Systems:


ROS


At least 10 ROS systems have been reviewed and are negative except as documented

in the HPI.


General: Negative except as outlined in HPI above.


Skin: Negative except as outlined in HPI above.


HEENT: Negative except as outlined in HPI above.


Neck: Negative except as outlined in HPI above.


Respiratory: Negative except as outlined in HPI above..


Cardiovascular: Negative except as outlined in HPI above.


Abdomen: Negative except as outlined in HPI above.


: Negative except as outlined in HPI above.


Back/MSK: Negative except as outlined in HPI above.


Neuro: Negative except as outlined in HPI above.


Psych: Negative except as outlined in HPI above.


 (CHRIS PAUL)





Allergies:


Allergies:





Allergies








Coded Allergies Type Severity Reaction Last Updated Verified


 


  No Known Drug Allergies    6/11/21 No








 (CHRIS PAUL)





Physical Exam:


PE:





Constitutional: Well developed, well nourished, no acute distress, tearful, 

depressed


HENT: Normocephalic, atraumatic, bilateral external ears normal, oropharynx 

moist, no oral exudates, nose normal. []


Eyes: PERRLA, EOMI, conjunctiva normal, no discharge. [] 


Neck: Normal range of motion, no tenderness, supple, no stridor. [] 


Cardiovascular:Heart rate regular rhythm, no murmur []


Lungs & Thorax:  Bilateral breath sounds clear to auscultation []


Abdomen: Bowel sounds normal, soft, no tenderness, no masses, no pulsatile 

masses. [] 


Skin: Warm, dry, no erythema, no rash. [] 


Back: No tenderness, no CVA tenderness. [] 


Extremities: No tenderness, no cyanosis, no clubbing, ROM intact, no edema. [] 


Neurologic: Alert and oriented X 3, normal motor function, normal sensory 

function, no focal deficits noted. []


Psychologic: Flat affect, abnormal judgment, anxious mood


 (CHRIS PAUL)





EKG:


EKG:


Sinus rhythm.  Heart rate 79 bpm.  No ST elevation or depression.  []


 (CHRIS PAUL)





Radiology/Procedures:


Radiology/Procedures:


[]


 (CHRIS PAUL)





Heart Score:


C/O Chest Pain:  No


Risk Factors:


Risk Factors:  DM, Current or recent (<one month) smoker, HTN, HLP, family 

history of CAD, obesity.


Risk Scores:


Score 0 - 3:  2.5% MACE over next 6 weeks - Discharge Home


Score 4 - 6:  20.3% MACE over next 6 weeks - Admit for Clinical Observation


Score 7 - 10:  72.7% MACE over next 6 weeks - Early Invasive Strategies


 (CHRIS PAUL)





Course & Med Decision Making:


Course & Med Decision Making


Pertinent Labs and Imaging studies reviewed. (See chart for details)





[] 26-year-old male presents with suicidal ideation.  Patient states that she 

currently has a plan to take pills.  Work-up in ER consisted of CBC, CMP, UDS, 

urinalysis.  PAT team was consulted.


 (CHRIS PAUL)


Course & Med Decision Making


Impression:





1.  Suicidal ideation


2.  Depression


3.  Anxiety disorder


4.  Bipolar





See PAT  report.


See Jeremias report.





Patient discharged home on safety plan with family.


 (CARLITOS SALAS MD)


Dragon Disclaimer:


Dragon Disclaimer:


This electronic medical record was generated, in whole or in part, using a voice

 recognition dictation system.


 (CHRIS PAUL)





Departure


Departure:


Impression:  


   Primary Impression:  


   Suicidal ideation


Disposition:  01 HOME / SELF CARE / HOMELESS


Condition:  STABLE


Referrals:  


ALISON HEADLEY MD (PCP)





Attending Signature


Attending Signature


I have participated in the care of this patient and I have reviewed and agree 

with all pertinent clinical information above including history, exam, and 

recommendations.





 (CARLITOS SALAS MD)





Dragon Disclaimer


This chart was dictated in whole or in part using Voice Recognition software in 

a busy, high-work load, and often noisy Emergency Department environment.  It 

may contain unintended and wholly unrecognized errors or omissions.


 (CARLITOS SALAS MD)











CHRIS PAUL               May 15, 2022 21:19


CARLITOS SALAS MD           May 16, 2022 18:00

## 2022-05-19 NOTE — EKG
[facility] [address], [place] [address]

Test Date:    2021               Test Time:    10:17:53

Pat Name:     LISA XIONG        Department:   

Patient ID:   [id number]           Room:          

Gender:       F                        Technician:   FRANCIA

:          [id number]               Requested By: CARLITOS SALAS

Order Number: [id number]            Reading MD:     

                                 Measurements

Intervals                              Axis          

Rate:         52                       P:            57

DE:           158                      QRS:          34

QRSD:         90                       T:            31

QT:           406                                    

QTc:          383                                    

                           Interpretive Statements

SINUS RHYTHM

OTHERWISE NORMAL ECG

RI6.02

No previous ECG available for comparison